# Patient Record
Sex: FEMALE | Race: WHITE | NOT HISPANIC OR LATINO | URBAN - METROPOLITAN AREA
[De-identification: names, ages, dates, MRNs, and addresses within clinical notes are randomized per-mention and may not be internally consistent; named-entity substitution may affect disease eponyms.]

---

## 2021-04-05 ENCOUNTER — OFFICE (OUTPATIENT)
Dept: URBAN - METROPOLITAN AREA CLINIC 107 | Facility: CLINIC | Age: 71
End: 2021-04-05
Payer: MEDICARE

## 2021-04-05 VITALS
SYSTOLIC BLOOD PRESSURE: 142 MMHG | HEART RATE: 67 BPM | DIASTOLIC BLOOD PRESSURE: 74 MMHG | WEIGHT: 167 LBS | TEMPERATURE: 96.8 F | HEIGHT: 64 IN

## 2021-04-05 DIAGNOSIS — R13.19 OTHER DYSPHAGIA: ICD-10-CM

## 2021-04-05 DIAGNOSIS — R14.2 ERUCTATION: ICD-10-CM

## 2021-04-05 DIAGNOSIS — R10.31 RIGHT LOWER QUADRANT PAIN: ICD-10-CM

## 2021-04-05 PROCEDURE — 99204 OFFICE O/P NEW MOD 45 MIN: CPT | Performed by: INTERNAL MEDICINE

## 2021-04-05 RX ORDER — PANTOPRAZOLE SODIUM 40 MG/1
40 TABLET, DELAYED RELEASE ORAL
Qty: 30 | Refills: 3 | Status: COMPLETED
Start: 2021-04-05 | End: 2022-07-27

## 2021-04-05 RX ORDER — SODIUM SULFATE, POTASSIUM SULFATE, MAGNESIUM SULFATE 17.5; 3.13; 1.6 G/ML; G/ML; G/ML
SOLUTION, CONCENTRATE ORAL
Qty: 1 | Refills: 0 | Status: COMPLETED
Start: 2021-04-05 | End: 2021-05-27

## 2021-04-05 NOTE — SERVICENOTES
Our goal is to partner with you to improve your health and well being. It is important for you to complete necessary testing and follow the instructions given to you at your clinic visit. Our office will call you within 2 weeks with results of any testing but you may also call sooner to obtain results - [phone].   If you have any questions or concerns please feel free to call us.  We take your care very seriously and we thank you for your trust!
 - schedule colonoscopy and upper endoscopy, if you have any issues with the prep (ie high cost, not covered) at the pharmacy please let us know
- Start psyllium fiber (brand examples are Metamucil and konsyl).  Do not get the one with artificial sweeteners (ie don't get Metamucil Sugar Free Orange).  Start with 1/2 dose and increase to full dose as tolerated.  If this causes too much gas, then please let me know.
- goal is to have a normal BM daily.  If psyllium doesn't work please let me know
- start pantoprazole 40 mg once daily 30 min before first meal of the day

## 2021-04-05 NOTE — SERVICEHPINOTES
Deedee Payton   is seen for an initial visit today.     She has some trouble swallowing.  there is also a pressure in her upper chest after eating. NO association with exercise or activity.  She had a stress tests and cardiac 2 years ago that was normal (for this issues).  Sometimes after eating she will feel like it gest stuck and she can cough and sometimes throw it up.  She doesn't really feel heartburn but more of a pressure and belching.  Coffee is worse. No artificial sweeteners.  She does drink milk from time to time.  Cereal once a week with milk.  She has had these symptoms for at least 2 years.  Symptoms are there at least couple times a week. She has also had 20 years of RLQ, it can radiate to the back. She started noticing it after her gallbladder.  She sometimes wonders if it is her hip.  Sometimes it feels better after a BM. BM can be somewhat irregular.  Allign did help with that.  She may go 3 days without a BM and then have a 4-5 in a row.  Her last colonoscopy was 10 years ago, she did have polyps in the past.   She also has had severeal diverticulitis attacks in the past.    My nurse has reviewed and updated the medication list with the patient. I have reviewed the medication list along with the documented medical, social and family history. Pertinent details are also noted above in the HPI.

## 2021-05-14 ENCOUNTER — AMBULATORY SURGICAL CENTER (OUTPATIENT)
Dept: URBAN - METROPOLITAN AREA SURGERY 19 | Facility: SURGERY | Age: 71
End: 2021-05-14
Payer: MEDICARE

## 2021-05-14 ENCOUNTER — OFFICE (OUTPATIENT)
Dept: URBAN - METROPOLITAN AREA PATHOLOGY 24 | Facility: PATHOLOGY | Age: 71
End: 2021-05-14
Payer: MEDICARE

## 2021-05-14 DIAGNOSIS — R07.9 CHEST PAIN, UNSPECIFIED: ICD-10-CM

## 2021-05-14 DIAGNOSIS — K57.30 DIVERTICULOSIS OF LARGE INTESTINE WITHOUT PERFORATION OR ABS: ICD-10-CM

## 2021-05-14 DIAGNOSIS — K44.9 DIAPHRAGMATIC HERNIA WITHOUT OBSTRUCTION OR GANGRENE: ICD-10-CM

## 2021-05-14 DIAGNOSIS — D12.3 BENIGN NEOPLASM OF TRANSVERSE COLON: ICD-10-CM

## 2021-05-14 DIAGNOSIS — K64.8 OTHER HEMORRHOIDS: ICD-10-CM

## 2021-05-14 DIAGNOSIS — R07.89 OTHER CHEST PAIN: ICD-10-CM

## 2021-05-14 DIAGNOSIS — D12.2 BENIGN NEOPLASM OF ASCENDING COLON: ICD-10-CM

## 2021-05-14 DIAGNOSIS — D12.5 BENIGN NEOPLASM OF SIGMOID COLON: ICD-10-CM

## 2021-05-14 DIAGNOSIS — K22.2 ESOPHAGEAL OBSTRUCTION: ICD-10-CM

## 2021-05-14 DIAGNOSIS — R14.0 ABDOMINAL DISTENSION (GASEOUS): ICD-10-CM

## 2021-05-14 DIAGNOSIS — Z12.11 ENCOUNTER FOR SCREENING FOR MALIGNANT NEOPLASM OF COLON: ICD-10-CM

## 2021-05-14 LAB
COLONOSCOPY STUDY: (no result)
COLONOSCOPY STUDY: (no result)
RELEVANT H&P ENDOSCOPY: (no result)
RELEVANT H&P ENDOSCOPY: (no result)

## 2021-05-14 PROCEDURE — 88305 TISSUE EXAM BY PATHOLOGIST: CPT | Performed by: INTERNAL MEDICINE

## 2021-05-14 PROCEDURE — 43239 EGD BIOPSY SINGLE/MULTIPLE: CPT | Performed by: INTERNAL MEDICINE

## 2021-05-14 PROCEDURE — 45385 COLONOSCOPY W/LESION REMOVAL: CPT | Mod: PT | Performed by: INTERNAL MEDICINE

## 2021-05-27 ENCOUNTER — OFFICE (OUTPATIENT)
Dept: URBAN - METROPOLITAN AREA CLINIC 72 | Facility: CLINIC | Age: 71
End: 2021-05-27
Payer: MEDICARE

## 2021-05-27 VITALS
DIASTOLIC BLOOD PRESSURE: 77 MMHG | HEIGHT: 64 IN | SYSTOLIC BLOOD PRESSURE: 143 MMHG | WEIGHT: 167 LBS | HEART RATE: 80 BPM

## 2021-05-27 DIAGNOSIS — R10.31 RIGHT LOWER QUADRANT PAIN: ICD-10-CM

## 2021-05-27 DIAGNOSIS — Z86.010 PERSONAL HISTORY OF COLONIC POLYPS: ICD-10-CM

## 2021-05-27 DIAGNOSIS — K44.9 DIAPHRAGMATIC HERNIA WITHOUT OBSTRUCTION OR GANGRENE: ICD-10-CM

## 2021-05-27 PROCEDURE — 99213 OFFICE O/P EST LOW 20 MIN: CPT | Performed by: INTERNAL MEDICINE

## 2021-05-27 RX ORDER — PANTOPRAZOLE 20 MG/1
TABLET, DELAYED RELEASE ORAL
Qty: 90 | Refills: 3 | Status: ACTIVE
Start: 2021-05-27

## 2021-05-27 NOTE — SERVICENOTES
Our goal is to partner with you to improve your health and well being. It is important for you to complete necessary testing and follow the instructions given to you at your clinic visit. Our office will call you within 2 weeks with results of any testing but you may also call sooner to obtain results [phone].   If you have any questions or concerns please feel free to call.  We take your care very seriously and we thank you for your trust!
- stop the pantoprazole 40 
- start pantoprazole 20 
- IF this doesn't work please let me know
- we will wait list you for repeat colonoscopy in 3 years.

## 2021-05-27 NOTE — SERVICEHPINOTES
Deedee Payton   is seen today for a follow-up visit.     Shelli has some trouble swallowing. there is also a pressure in her upper chest after eating. NO association with exercise or activity. She had a stress tests and cardiac 2 years ago that was normal (for this issues). Sometimes after eating she will feel like it gest stuck and she can cough and sometimes throw it up. She doesn't really feel heartburn but more of a pressure and belching. Coffee is worse. No artificial sweeteners. She does drink milk from time to time. Cereal once a week with milk. She has had these symptoms for at least 2 years. Symptoms are there at least couple times a week. She has also had 20 years of RLQ, it can radiate to the back. She started noticing it after her gallbladder. She sometimes wonders if it is her hip. Sometimes it feels better after a BM. BM can be somewhat irregular. Allign did help with that. She may go 3 days without a BM and then have a 4-5 in a row. Her last colonoscopy was 10 years ago, she did have polyps in the past. She also has had severeal diverticulitis attacks in the past.   Plan from last visit:- schedule colonoscopy and upper endoscopy, if you have any issues with the prep (ie high cost, not covered) at the pharmacy please let us knowBR- Start psyllium fiber (brand examples are Metamucil and konsyl). Do not get the one with artificial sweeteners (ie don't get Metamucil Sugar Free Orange). Start with 1/2 dose and increase to full dose as tolerated. If this causes too much gas, then please let me know.BR- goal is to have a normal BM daily. If psyllium doesn't work please let me knowBR- start pantoprazole 40 mg once daily 30 min before first meal of the dayInterval History:  5/27/2021  She is on the pantoprazole once a day. BRThe chest pain is gone and she is doing well from that standpoint.  BRNo swallowing difficulty at all. Occasional scratchy throat which may be allergies. Right sided pain is still there.  She did not try the psyllium because she had a BM every day and so she didn't take it.  It doesn't seem to be related to the pain. The pain can be really bad.  Most of the time it is 2-3.  She has had it over 20 years.  It doesn't seem to be related to anything My nurse has reviewed and updated the medication list with the patient (medication reconciliation). I have also reviewed the medication list. New updates were made to the patient's medical, social and family history. Pertinent details are also noted above in the HPI.BR

## 2022-07-27 ENCOUNTER — OFFICE (OUTPATIENT)
Dept: URBAN - METROPOLITAN AREA CLINIC 72 | Facility: CLINIC | Age: 72
End: 2022-07-27

## 2022-07-27 VITALS
OXYGEN SATURATION: 97 % | DIASTOLIC BLOOD PRESSURE: 72 MMHG | HEART RATE: 83 BPM | HEIGHT: 64 IN | WEIGHT: 173 LBS | SYSTOLIC BLOOD PRESSURE: 130 MMHG

## 2022-07-27 DIAGNOSIS — R10.31 RIGHT LOWER QUADRANT PAIN: ICD-10-CM

## 2022-07-27 DIAGNOSIS — K44.9 DIAPHRAGMATIC HERNIA WITHOUT OBSTRUCTION OR GANGRENE: ICD-10-CM

## 2022-07-27 DIAGNOSIS — R14.0 ABDOMINAL DISTENSION (GASEOUS): ICD-10-CM

## 2022-07-27 DIAGNOSIS — Z86.010 PERSONAL HISTORY OF COLONIC POLYPS: ICD-10-CM

## 2022-07-27 DIAGNOSIS — R19.4 CHANGE IN BOWEL HABIT: ICD-10-CM

## 2022-07-27 PROCEDURE — 99214 OFFICE O/P EST MOD 30 MIN: CPT | Performed by: INTERNAL MEDICINE

## 2022-07-27 RX ORDER — PANTOPRAZOLE 20 MG/1
TABLET, DELAYED RELEASE ORAL
Qty: 90 | Refills: 3 | Status: ACTIVE
Start: 2021-05-27

## 2022-07-27 NOTE — SERVICENOTES
Our goal is to partner with you to improve your health and well being. It is important for you to complete necessary testing and follow the instructions given to you at your clinic visit. Our office will call you within 2 weeks with results of any testing but you may also call sooner to obtain results [phone].   If you have any questions or concerns please feel free to call.  We take your care very seriously and we thank you for your trust!
- schedule CT
- continue pantoprazole 20 mg
- try to keep track of your lactose intake and if it impacts pain and/or frequent stools, see low lactose diet handout
- repeat colonoscopy due 5/2024
- follow up as needed or yearly if you are doing well.

## 2022-07-27 NOTE — SERVICEHPINOTES
Deedee Payton   is seen today for a follow-up visit.  
br
Summertially seen 4/21 for some trouble swallowing. there is also a pressure in her upper chest after eating. NO association with exercise or activity. She had a stress tests and cardiac 2 years ago that was normal (for this issues). Sometimes after eating she will feel like it gest stuck and she can cough and sometimes throw it up. She doesn't really feel heartburn but more of a pressure and belching. Coffee is worse. No artificial sweeteners. She does drink milk from time to time. Cereal once a week with milk. She has had these symptoms for at least 2 years. Symptoms are there at least couple times a week.She has also had 20 years of RLQ, it can radiate to the back. She started noticing it after her gallbladder. She sometimes wonders if it is her hip. Sometimes it feels better after a BM. BM can be somewhat irregular. Allign did help with that. She may go 3 days without a BM and then have a 4-5 in a row. Her last colonoscopy was 10 years ago, she did have polyps in the past.She also has had severeal diverticulitis attacks in the past.brInterval History:5/27/2021She is on the pantoprazole once a day.brThe chest pain is gone and she is doing well from that standpoint. brNo swallowing difficulty at all.Occasional scratchy throat which may be allergies.Right sided pain is still there. She did not try the psyllium because she had a BM every day and so she didn't take it. It doesn't seem to be related to the pain. The pain can be really bad. Most of the time it is 2-3. She has had it over 20 years. It doesn't seem to be related to anything br    br  Plan from last visit:
br
br- stop the pantoprazole 40 br- start pantoprazole 20 br- IF this doesn't work please let me knowbr- we will wait list you for repeat colonoscopy in 3 years. Interval History:  7/27/2022   
br
br   She continues to have right sided abdominal pain.  it is RLQ. 
brabout 2-4 times a month she will have a day with frequent BM which may be loose but not diarrhea up to 6 times a day.  She does not wakt up in the middle of the night to have a BM.  she has random sharp pains.  she feels like the increase tenderness in the RLQ may be related to increase bowel frequency but some days she will have discomfor without the increase bowel frequency.  some times it is there with sleeping.  At times she feels bloated and distended br
br She is on 20 mg and her GERD is doing really well.  she needs a refill of that. My nurse has reviewed and updated the medication list with the patient (medication reconciliation). I have also reviewed the medication list. New updates were made to the patient's medical, social and family history. Pertinent details are also noted above in the HPI.br visited="true"

## 2022-08-09 ENCOUNTER — OFFICE (OUTPATIENT)
Dept: URBAN - METROPOLITAN AREA CLINIC 72 | Facility: CLINIC | Age: 72
End: 2022-08-09
Payer: MEDICARE

## 2022-08-09 VITALS
OXYGEN SATURATION: 85 % | WEIGHT: 172 LBS | HEART RATE: 96 BPM | SYSTOLIC BLOOD PRESSURE: 112 MMHG | DIASTOLIC BLOOD PRESSURE: 76 MMHG | HEIGHT: 64 IN

## 2022-08-09 DIAGNOSIS — K66.8 OTHER SPECIFIED DISORDERS OF PERITONEUM: ICD-10-CM

## 2022-08-09 DIAGNOSIS — R10.31 RIGHT LOWER QUADRANT PAIN: ICD-10-CM

## 2022-08-09 DIAGNOSIS — R93.89 ABNORMAL FINDINGS ON DIAGNOSTIC IMAGING OF OTHER SPECIFIED B: ICD-10-CM

## 2022-08-09 PROCEDURE — 99214 OFFICE O/P EST MOD 30 MIN: CPT | Performed by: INTERNAL MEDICINE

## 2022-08-09 NOTE — SERVICENOTES
Our goal is to partner with you to improve your health and well being. It is important for you to complete necessary testing and follow the instructions given to you at your clinic visit. Our office will call you within 2 weeks with results of any testing but you may also call sooner to obtain results [phone].   If you have any questions or concerns please feel free to call.  We take your care very seriously and we thank you for your trust!
- labs today
- if you don't hear from them to schedule the procedure by tomorrow please let us know

## 2022-08-09 NOTE — SERVICEHPINOTES
Deedee Payton   is seen today for a follow-up visit.  
br
Summertially seen 4/21 for some trouble swallowing. there is also a pressure in her upper chest after eating. NO association with exercise or activity. She had a stress tests and cardiac 2 years ago that was normal (for this issues). Sometimes after eating she will feel like it gest stuck and she can cough and sometimes throw it up. She doesn't really feel heartburn but more of a pressure and belching. Coffee is worse. No artificial sweeteners. She does drink milk from time to time. Cereal once a week with milk. She has had these symptoms for at least 2 years. Symptoms are there at least couple times a week.She has also had 20 years of RLQ, it can radiate to the back. She started noticing it after her gallbladder. She sometimes wonders if it is her hip. Sometimes it feels better after a BM. BM can be somewhat irregular. Allign did help with that. She may go 3 days without a BM and then have a 4-5 in a row. Her last colonoscopy was 10 years ago, she did have polyps in the past.She also has had severeal diverticulitis attacks in the past.brInterval History:5/27/2021She is on the pantoprazole once a day.brThe chest pain is gone and she is doing well from that standpoint.brNo swallowing difficulty at all.Occasional scratchy throat which may be allergies.Right sided pain is still there. She did not try the psyllium because she had a BM every day and so she didn't take it. It doesn't seem to be related to the pain.The pain can be really bad. Most of the time it is 2-3. She has had it over 20 years. It doesn't seem to be related to anythingInterval History:7/27/2022She continues to have right sided abdominal pain. it is RLQ.brabout 2-4 times a month she will have a day with frequent BM which may be loose but not diarrhea up to 6 times a day. She does not wakt up in the middle of the night to have a BM. she has random sharp pains. she feels like the increase tenderness in the RLQ may be related to increase bowel frequency but some days she will have discomfor without the increase bowel frequency. some times it is there with sleeping. At times she feels bloated and distended She is on 20 mg and her GERD is doing really well. she needs a refill of that.    br  Plan from last visit:
br
br schedule CTbr- continue pantoprazole 20 mgbr- try to keep track of your lactose intake and if it impacts pain and/or frequent stools, see low lactose diet handoutbr- repeat colonoscopy due 5/2024br- follow up as needed or yearly if you are doing well. Interval History:  8/9/2022   
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
br she reports just random mild abdominal pain that she has had for over 10 years.  NO changes, no weight lossbr
br She did have a Breast cancer scar but they removed a lump and it was benign.  She has had her yearly mamograms. She had her colonoscopy in 5/21.  She has not had a hysterectomy.  She has not seen an OBGYN recently.  She has annual pap done by her pcp. About 10 years aog she had a very difficult time with diverticulitis she was off and on for a year with multiple hospiitlizations and antibiotics.  She was having CT with contrast frequently.  she had a renal ultrasound in 2018.  My nurse has reviewed and updated the medication list with the patient (medication reconciliation). I have also reviewed the medication list. New updates were made to the patient's medical, social and family history. Pertinent details are also noted above in the HPI.br visited="true"

## 2022-11-14 ENCOUNTER — OFFICE (OUTPATIENT)
Dept: URBAN - METROPOLITAN AREA CLINIC 72 | Facility: CLINIC | Age: 72
End: 2022-11-14

## 2022-11-14 VITALS
DIASTOLIC BLOOD PRESSURE: 76 MMHG | HEART RATE: 76 BPM | OXYGEN SATURATION: 96 % | SYSTOLIC BLOOD PRESSURE: 140 MMHG | HEIGHT: 64 IN | WEIGHT: 173 LBS

## 2022-11-14 DIAGNOSIS — K66.8 OTHER SPECIFIED DISORDERS OF PERITONEUM: ICD-10-CM

## 2022-11-14 DIAGNOSIS — R10.31 RIGHT LOWER QUADRANT PAIN: ICD-10-CM

## 2022-11-14 DIAGNOSIS — K44.9 DIAPHRAGMATIC HERNIA WITHOUT OBSTRUCTION OR GANGRENE: ICD-10-CM

## 2022-11-14 DIAGNOSIS — R14.0 ABDOMINAL DISTENSION (GASEOUS): ICD-10-CM

## 2022-11-14 DIAGNOSIS — R93.89 ABNORMAL FINDINGS ON DIAGNOSTIC IMAGING OF OTHER SPECIFIED B: ICD-10-CM

## 2022-11-14 DIAGNOSIS — K21.9 GASTRO-ESOPHAGEAL REFLUX DISEASE WITHOUT ESOPHAGITIS: ICD-10-CM

## 2022-11-14 PROCEDURE — 99214 OFFICE O/P EST MOD 30 MIN: CPT | Performed by: INTERNAL MEDICINE

## 2022-11-14 RX ORDER — PANTOPRAZOLE SODIUM 40 MG/1
40 TABLET, DELAYED RELEASE ORAL
Qty: 90 | Refills: 0 | Status: ACTIVE
Start: 2022-11-14

## 2022-11-14 NOTE — SERVICENOTES
Our goal is to partner with you to improve your health and well being. It is important for you to complete necessary testing and follow the instructions given to you at your clinic visit. Our office will call you within 2 weeks with results of any testing but you may also call sooner to obtain results [phone].   If you have any questions or concerns please feel free to call.  We take your care very seriously and we thank you for your trust!

- follow up end of janaury or early february so that I have CT results
- CT in early january
- breath test
- increase pantoprazole to 40 mg a day

## 2022-11-14 NOTE — SERVICEHPINOTES
Deedee Payton   is seen today for a follow-up visit.  
br
martially seen 4/21 for some trouble swallowing. there is also a pressure in her upper chest after eating. NO association with exercise or activity. She had a stress tests and cardiac 2 years ago that was normal (for this issues). Sometimes after eating she will feel like it gest stuck and she can cough and sometimes throw it up. She doesn't really feel heartburn but more of a pressure and belching. Coffee is worse. No artificial sweeteners. She does drink milk from time to time. Cereal once a week with milk. She has had these symptoms for at least 2 years. Symptoms are there at least couple times a week.She has also had 20 years of RLQ, it can radiate to the back. She started noticing it after her gallbladder. She sometimes wonders if it is her hip. Sometimes it feels better after a BM. BM can be somewhat irregular. Allign did help with that. She may go 3 days without a BM and then have a 4-5 in a row. Her last colonoscopy was 10 years ago, she did have polyps in the past.She also has had severeal diverticulitis attacks in the past.brInterval History:5/27/2021She is on the pantoprazole once a day.brThe chest pain is gone and she is doing well from that standpoint.brNo swallowing difficulty at all.Occasional scratchy throat which may be allergies.Right sided pain is still there. She did not try the psyllium because she had a BM every day and so she didn't take it. It doesn't seem to be related to the pain.The pain can be really bad. Most of the time it is 2-3. She has had it over 20 years. It doesn't seem to be related to anythingInterval History:7/27/2022She continues to have right sided abdominal pain. it is RLQ.brabout 2-4 times a month she will have a day with frequent BM which may be loose but not diarrhea up to 6 times a day. She does not wakt up in the middle of the night to have a BM. she has random sharp pains. she feels like the increase tenderness in the RLQ may be related to increase bowel frequency but some days she will have discomfor without the increase bowel frequency. some times it is there with sleeping. At times she feels bloated and distendedShe is on 20 mg and her GERD is doing really well. she needs a refill of that.Interval History:8/9/2022brCT shows findings concerning for omeental carcinomatosisbrtere reports just random mild abdominal pain that she has had for over 10 years. NO changes, no weight lossShwillie did have a Breast cancer scar but they removed a lump and it was benign. She has had her yearly mamograms. She had her colonoscopy in 5/21. She has not had a hysterectomy. She has not seen an OBGYN recently. She has annual pap done by her pcp. About 10 years aog she had a very difficult time with diverticulitis she was off and on for a year with multiple hospiitlizations and antibiotics. She was having CT with contrast frequently. she had a renal ultrasound in 2018.     br  Plan from last visit:
br labs todaybr- if you don't hear from them to schedule the procedure by tomorrow please let us know Interval History:  11/14/2022   
br   She has gone back to  of the initial symptoms.  Coughing after eating that becomes very forceful and then leads to vomiting  
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  
brPressure in the chest is coming back and there is associated bloated.  br She continues to have random right sided abdominal pain
br Seh is currently taking 20 mg of pantoprazole a day.  
br She is on align, no artificial sweetenrs.  My nurse has reviewed and updated the medication list with the patient (medication reconciliation). I have also reviewed the medication list. New updates were made to the patient's medical, social and family history. Pertinent details are also noted above in the HPI.br visited="true"

## 2023-01-11 ENCOUNTER — OFFICE (OUTPATIENT)
Dept: URBAN - METROPOLITAN AREA CLINIC 72 | Facility: CLINIC | Age: 73
End: 2023-01-11
Payer: MEDICARE

## 2023-01-11 VITALS
SYSTOLIC BLOOD PRESSURE: 139 MMHG | WEIGHT: 169 LBS | OXYGEN SATURATION: 96 % | DIASTOLIC BLOOD PRESSURE: 81 MMHG | HEART RATE: 77 BPM | HEIGHT: 64 IN

## 2023-01-11 DIAGNOSIS — K44.9 DIAPHRAGMATIC HERNIA WITHOUT OBSTRUCTION OR GANGRENE: ICD-10-CM

## 2023-01-11 DIAGNOSIS — K63.89 OTHER SPECIFIED DISEASES OF INTESTINE: ICD-10-CM

## 2023-01-11 DIAGNOSIS — R93.89 ABNORMAL FINDINGS ON DIAGNOSTIC IMAGING OF OTHER SPECIFIED B: ICD-10-CM

## 2023-01-11 DIAGNOSIS — Z86.010 PERSONAL HISTORY OF COLONIC POLYPS: ICD-10-CM

## 2023-01-11 DIAGNOSIS — R10.31 RIGHT LOWER QUADRANT PAIN: ICD-10-CM

## 2023-01-11 DIAGNOSIS — K66.8 OTHER SPECIFIED DISORDERS OF PERITONEUM: ICD-10-CM

## 2023-01-11 DIAGNOSIS — K58.9 IRRITABLE BOWEL SYNDROME WITHOUT DIARRHEA: ICD-10-CM

## 2023-01-11 PROCEDURE — 99214 OFFICE O/P EST MOD 30 MIN: CPT | Performed by: INTERNAL MEDICINE

## 2023-01-11 RX ORDER — PANTOPRAZOLE 40 MG/1
TABLET, DELAYED RELEASE ORAL
Qty: 30 | Refills: 0 | Status: ACTIVE

## 2023-01-11 RX ORDER — RIFAXIMIN 550 MG/1
1650 TABLET ORAL
Qty: 42 | Refills: 0 | Status: COMPLETED
Start: 2023-01-11 | End: 2024-08-26

## 2023-01-11 NOTE — SERVICENOTES
Our goal is to partner with you to improve your health and well being. It is important for you to complete necessary testing and follow the instructions given to you at your clinic visit. Our office will call you within 2 weeks with results of any testing but you may also call sooner to obtain results [phone].   If you have any questions or concerns please feel free to call.  We take your care very seriously and we thank you for your trust!
- referral placed to Dr. Chatterjee
- , Take Xifaxan 1 pill three times a day for 14 days, if you have an issue with your pharmacy with this medication please let us know

## 2023-01-11 NOTE — SERVICEHPINOTES
Deedee Payton   is seen today for a follow-up visit.  
br

javier I follow her for RLQ pain with abnormal CT showing thickening in the omentum, history of severe diverticulitis, SIBO, GERD. javier
martially seen 4/21 for some trouble swallowing. there is also a pressure in her upper chest after eating. NO association with exercise or activity. She had a stress tests and cardiac 2 years ago that was normal (for this issues). Sometimes after eating she will feel like it gest stuck and she can cough and sometimes throw it up. She doesn't really feel heartburn but more of a pressure and belching. Coffee is worse. No artificial sweeteners. She does drink milk from time to time. Cereal once a week with milk. She has had these symptoms for at least 2 years. Symptoms are there at least couple times a week.She has also had 20 years of RLQ, it can radiate to the back. She started noticing it after her gallbladder. She sometimes wonders if it is her hip. Sometimes it feels better after a BM. BM can be somewhat irregular. Penelope did help with that. She may go 3 days without a BM and then have a 4-5 in a row. Her last colonoscopy was 10 years ago, she did have polyps in the past.She also has had severeal diverticulitis attacks in the past.brInterval History:5/27/2021She is on the pantoprazole once a day.brThe chest pain is gone and she is doing well from that standpoint.brNo swallowing difficulty at all.Occasional scratchy throat which may be allergies.Right sided pain is still there. She did not try the psyllium because she had a BM every day and so she didn't take it. It doesn't seem to be related to the pain.The pain can be really bad. Most of the time it is 2-3. She has had it over 20 years. It doesn't seem to be related to anythingInterval History:7/27/2022She continues to have right sided abdominal pain. it is RLQ.brabout 2-4 times a month she will have a day with frequent BM which may be loose but not diarrhea up to 6 times a day. She does not wakt up in the middle of the night to have a BM. she has random sharp pains. she feels like the increase tenderness in the RLQ may be related to increase bowel frequency but some days she will have discomfor without the increase bowel frequency. some times it is there with sleeping. At times she feels bloated and distendedShe is on 20 mg and her GERD is doing really well. she needs a refill of that.Interval History:8/9/2022brCT shows findings concerning for omeental carcinomatosisbrshe reports just random mild abdominal pain that she has had for over 10 years. NO changes, no weight lossShe did have a Breast cancer scar but they removed a lump and it was benign. She has had her yearly mamograms. She had her colonoscopy in 5/21. She has not had a hysterectomy. She has not seen an OBGYN recently. She has annual pap done by her pcp. About 10 years aog she had a very difficult time with diverticulitis she was off and on for a year with multiple hospiitlizations and antibiotics. She was having CT with contrast frequently. she had a renal ultrasound in 2018.Interval History:11/14/2022brShe has gone back to seom of the initial symptoms. 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 brPressure in the chest is coming back and there is associated bloated. brShe continues to have random right sided abdominal painbrSeh is currently taking 20 mg of pantoprazole a day. brShe is on align, no artificial sweetenrs. br    br  Plan from last visit:
br
br- follow up end of janaury or early february so that I have CT resultsbr- CT in early januarybr- breath testbr- increase pantoprazole to 40 mg a day Interval History:  1/11/2023   
br   CT with possible increase in omental lesion
br SIBO breath test positive
br She just has some occasional reflux symptoms.  It has improved.  She is on pantoprazole 40 and needs a new perscription. 
br Still wtih ongoing Right sided pain.  Periodically she will have a day wtih frequent loose BM. That happens randomly about once a month.  she feels occasionally bloated.  My nurse has reviewed and updated the medication list with the patient (medication reconciliation). I have also reviewed the medication list. New updates were made to the patient's medical, social and family history. Pertinent details are also noted above in the HPI.br visited="true"

## 2025-04-14 ENCOUNTER — OFFICE (OUTPATIENT)
Dept: URBAN - METROPOLITAN AREA CLINIC 72 | Facility: CLINIC | Age: 75
End: 2025-04-14
Payer: MEDICARE

## 2025-04-14 VITALS
HEART RATE: 65 BPM | HEIGHT: 64 IN | SYSTOLIC BLOOD PRESSURE: 136 MMHG | WEIGHT: 172 LBS | DIASTOLIC BLOOD PRESSURE: 72 MMHG

## 2025-04-14 DIAGNOSIS — C56.9 MALIGNANT NEOPLASM OF UNSPECIFIED OVARY: ICD-10-CM

## 2025-04-14 DIAGNOSIS — K44.9 DIAPHRAGMATIC HERNIA WITHOUT OBSTRUCTION OR GANGRENE: ICD-10-CM

## 2025-04-14 DIAGNOSIS — K59.00 CONSTIPATION, UNSPECIFIED: ICD-10-CM

## 2025-04-14 PROCEDURE — 99213 OFFICE O/P EST LOW 20 MIN: CPT | Performed by: INTERNAL MEDICINE

## 2025-04-14 NOTE — SERVICENOTES
Our goal is to partner with you to improve your health and well being. It is important for you to complete necessary testing and follow the instructions given to you at your clinic visit. Our office will call you within 2 weeks with results of any testing but you may also call sooner to obtain results [phone].   If you have any questions or concerns please feel free to call.  We take your care very seriously and we thank you for your trust!
- senna 2-4 pills a day as needed for constipation.  
-  continue pantoprazole 40mg once a day before breakfast
- you can take famotidine 40 mg as needed for reflux
- , Follow up as needed if symptoms are not improving or you have new or concerning symptoms.

## 2025-04-14 NOTE — SERVICEHPINOTES
Deedee Payton   is seen today for a follow-up visit.  
Mirlande follow her for RLQ pain with abnormal CT showing thickening in the omentum, history of severe diverticulitis, SIBO, GERD.nitially seen 4/21 for some trouble swallowing. there is also a pressure in her upper chest after eating. NO association with exercise or activity. She had a stress tests and cardiac 2 years ago that was normal (for this issues). Sometimes after eating she will feel like it gest stuck and she can cough and sometimes throw it up. She doesn't really feel heartburn but more of a pressure and belching. Coffee is worse. No artificial sweeteners. She does drink milk from time to time. Cereal once a week with milk. She has had these symptoms for at least 2 years. Symptoms are there at least couple times a week.She has also had 20 years of RLQ, it can radiate to the back. She started noticing it after her gallbladder. She sometimes wonders if it is her hip. Sometimes it feels better after a BM. BM can be somewhat irregular. Penelope did help with that. She may go 3 days without a BM and then have a 4-5 in a row. Her last colonoscopy was 10 years ago, she did have polyps in the past.She also has had severeal diverticulitis attacks in the past.brInterval History:5/27/2021She is on the pantoprazole once a day.brThe chest pain is gone and she is doing well from that standpoint.brNo swallowing difficulty at all.Occasional scratchy throat which may be allergies.Right sided pain is still there. She did not try the psyllium because she had a BM every day and so she didn't take it. It doesn't seem to be related to the pain.The pain can be really bad. Most of the time it is 2-3. She has had it over 20 years. It doesn't seem to be related to anythingInterval History:7/27/2022She continues to have right sided abdominal pain. it is RLQ.brabout 2-4 times a month she will have a day with frequent BM which may be loose but not diarrhea up to 6 times a day. She does not wakt up in the middle of the night to have a BM. she has random sharp pains. she feels like the increase tenderness in the RLQ may be related to increase bowel frequency but some days she will have discomfor without the increase bowel frequency. some times it is there with sleeping. At times she feels bloated and distendedShe is on 20 mg and her GERD is doing really well. she needs a refill of that.Interval History:8/9/2022brCT shows findings concerning for omeental carcinomatosisbrshe reports just random mild abdominal pain that she has had for over 10 years. NO changes, no weight lossShe did have a Breast cancer scar but they removed a lump and it was benign. She has had her yearly mamograms. She had her colonoscopy in 5/21. She has not had a hysterectomy. She has not seen an OBGYN recently. She has annual pap done by her pcp. About 10 years aog she had a very difficult time with diverticulitis she was off and on for a year with multiple hospiitlizations and antibiotics. She was having CT with contrast frequently. she had a renal ultrasound in 2018.Interval History:11/14/2022brShe has gone back to seom of the initial symptoms. 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 It has improved. She is on pantoprazole 40 and needs a new perscription.brStill wtih ongoing Right sided pain. Periodically she will have a day wtih frequent loose BM. That happens randomly about once a month. 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 Chatterjee and treatment wtih Dr. Lees.brSHe went debulking surgery omentectomy. brThe patient, with a history of ovarian cancer, presents with concerns about increasing levels of  despite clear CT scans. The patient underwent chemotherapy and surgery for ovarian cancer and has been on a maintenance dose of Imbazi every three weeks. The patient reports fatigue, which she attributes to her cancer treatment.The patient also reports symptoms of acid reflux, for which she has been taking pantoprazole 40 mg. She has been sleeping elevated to manage her symptoms and reports only three episodes of reflux in the past six to eight months. The patient also experiences neuropathy, a side effect from her chemotherapy, which has not improved despite trying various medications.The patient's  is actively involved in her care and accompanies her to her appointments. The patient expresses gratitude for the quick diagnosis and treatment she received for her cancer. Despite her increasing  levels, the patient reports that she is often told she looks well.br    br  Plan from last visit:
br- trial of decreasing pantoprazole to 20 mg a day, if this doesnt work then let me know and we will go back to 40br- labsbr- follow up in 1 year Interval History:  4/14/2025   
br   Tsh was low but t3 and t4 were normal, b12 was normal 
br She went back to pantoprazole 40 and added on famotidine 40 
br
br She is on her second round of chemo.  She was off chemo for about 9 months except one med every 2-3 weeks for 3 years.  Now she is on chemo and is on the 5 of 6 . Her  is dropping. 
br
Dave is on pantoprazole 40 mg and that controls things.  She had a bad episode when she was away and I prescribed famotidine 40 but it resolved and she hasn't needed that.  
br
br She has had multiple scans for cancer.  She had a lung CT and was told that she had a little scarring on her lungs.  
br
br She gets constipated with chemo.  She has tried stool softeners.   She tried miralax or stool softners.  br
br br
br My nurse has reviewed and updated the medication list with the patient (medication reconciliation). I have also reviewed the medication list. New updates were made to the patient's medical, social and family history. Pertinent details are also noted above in the HPI.br visited="true"